# Patient Record
Sex: FEMALE | Race: WHITE
[De-identification: names, ages, dates, MRNs, and addresses within clinical notes are randomized per-mention and may not be internally consistent; named-entity substitution may affect disease eponyms.]

---

## 2018-04-03 ENCOUNTER — HOSPITAL ENCOUNTER (OUTPATIENT)
Dept: HOSPITAL 17 - CPRE | Age: 78
End: 2018-04-03
Attending: OBSTETRICS & GYNECOLOGY
Payer: MEDICARE

## 2018-04-03 DIAGNOSIS — Z01.810: Primary | ICD-10-CM

## 2018-04-03 DIAGNOSIS — Z01.812: ICD-10-CM

## 2018-04-03 DIAGNOSIS — R94.31: ICD-10-CM

## 2018-04-03 DIAGNOSIS — N81.11: ICD-10-CM

## 2018-04-03 DIAGNOSIS — R82.90: ICD-10-CM

## 2018-04-03 LAB
ALBUMIN SERPL-MCNC: 3.9 GM/DL (ref 3.4–5)
ALP SERPL-CCNC: 59 U/L (ref 45–117)
ALT SERPL-CCNC: 17 U/L (ref 10–53)
AST SERPL-CCNC: 9 U/L (ref 15–37)
BASOPHILS # BLD AUTO: 0.1 TH/MM3 (ref 0–0.2)
BASOPHILS NFR BLD: 0.9 % (ref 0–2)
BILIRUB SERPL-MCNC: 0.4 MG/DL (ref 0.2–1)
BUN SERPL-MCNC: 21 MG/DL (ref 7–18)
CALCIUM SERPL-MCNC: 9.5 MG/DL (ref 8.5–10.1)
CHLORIDE SERPL-SCNC: 103 MEQ/L (ref 98–107)
COLOR UR: (no result)
CREAT SERPL-MCNC: 1.09 MG/DL (ref 0.5–1)
EOSINOPHIL # BLD: 0.1 TH/MM3 (ref 0–0.4)
EOSINOPHIL NFR BLD: 1.5 % (ref 0–4)
ERYTHROCYTE [DISTWIDTH] IN BLOOD BY AUTOMATED COUNT: 14.7 % (ref 11.6–17.2)
GFR SERPLBLD BASED ON 1.73 SQ M-ARVRAT: 49 ML/MIN (ref 89–?)
GLUCOSE UR STRIP-MCNC: (no result) MG/DL
HCO3 BLD-SCNC: 27 MEQ/L (ref 21–32)
HCT VFR BLD CALC: 37.9 % (ref 35–46)
HGB BLD-MCNC: 13.4 GM/DL (ref 11.6–15.3)
HGB UR QL STRIP: (no result)
HYALINE CASTS #/AREA URNS LPF: 2 /LPF
KETONES UR STRIP-MCNC: (no result) MG/DL
LYMPHOCYTES # BLD AUTO: 1.1 TH/MM3 (ref 1–4.8)
LYMPHOCYTES NFR BLD AUTO: 13.5 % (ref 9–44)
MCH RBC QN AUTO: 30.6 PG (ref 27–34)
MCHC RBC AUTO-ENTMCNC: 35.3 % (ref 32–36)
MCV RBC AUTO: 86.5 FL (ref 80–100)
MONOCYTE #: 0.8 TH/MM3 (ref 0–0.9)
MONOCYTES NFR BLD: 9.3 % (ref 0–8)
NEUTROPHILS # BLD AUTO: 6.2 TH/MM3 (ref 1.8–7.7)
NEUTROPHILS NFR BLD AUTO: 74.8 % (ref 16–70)
NITRITE UR QL STRIP: (no result)
PLATELET # BLD: 264 TH/MM3 (ref 150–450)
PMV BLD AUTO: 6.6 FL (ref 7–11)
PROT SERPL-MCNC: 7.7 GM/DL (ref 6.4–8.2)
RBC # BLD AUTO: 4.39 MIL/MM3 (ref 4–5.3)
SODIUM SERPL-SCNC: 137 MEQ/L (ref 136–145)
SP GR UR STRIP: 1.01 (ref 1–1.03)
SQUAMOUS #/AREA URNS HPF: 4 /HPF (ref 0–5)
TRANS CELLS #/AREA URNS HPF: 1 /HPF
URINE LEUKOCYTE ESTERASE: (no result)
WBC # BLD AUTO: 8.2 TH/MM3 (ref 4–11)

## 2018-04-03 PROCEDURE — 36415 COLL VENOUS BLD VENIPUNCTURE: CPT

## 2018-04-03 PROCEDURE — 85025 COMPLETE CBC W/AUTO DIFF WBC: CPT

## 2018-04-03 PROCEDURE — 93005 ELECTROCARDIOGRAM TRACING: CPT

## 2018-04-03 PROCEDURE — 81001 URINALYSIS AUTO W/SCOPE: CPT

## 2018-04-03 PROCEDURE — 87086 URINE CULTURE/COLONY COUNT: CPT

## 2018-04-03 PROCEDURE — 80053 COMPREHEN METABOLIC PANEL: CPT

## 2018-04-03 NOTE — MH
cc:

Watson Barksdale MD

****

 

 

DATE OF ADMISSION:

2018

 

REASON FOR ADMISSION:

Scheduled for admission 2018 for anterior and posterior repair.

 

HISTORY OF PRESENT ILLNESS:

The patient is a 78-year-old white female,  3, para 3, who has 

issues with stage III pelvic organ prolapse, anterior compartment 

predominant.  She has documented urinary retention of over 900 mL.  

She, at this point, has opted for surgical correction.

 

PAST MEDICAL HISTORY:

Notable for hypertension, renal cyst on the right kidney, stable 6 cm.

 Also note, a distant history of hepatitis C, apparently has negative 

titers at this point.

 

MEDICATIONS:

1.  Xanax 0.25 mg every day p.r.n.

2.  Losartan 100 mg daily.

 

SOCIAL HISTORY:

, has good social support.  No alcohol, tobacco, or caffeine.

 

OB HISTORY:

Two vaginal deliveries.

 

GYN HISTORY:

No STDs or abnormal Pap smears.

 

ALLERGIES:

NONE.

 

FAMILY HISTORY:

Noncontributory.

 

REVIEW OF SYSTEMS:

As above.  No chest pain, orthopnea, PND.  No nausea, vomiting, fevers

or chills.  No vaginal bleeding or discharge.

 

PHYSICAL EXAM:

VITAL SIGNS:  She is afebrile, vital signs are stable.  Blood pressure

is 120/70.  Her height is 5 feet 2 inches, her weight 128.  Her BMI is

23.

GENERAL:  The patient is alert and oriented in no acute distress.  No 

sign of cognitive dysfunction or depression.

HEENT:  Within normal limits.

NECK:  Supple.  No JVD.

CHEST:  Clear.

HEART:  Regular rate and rhythm.

ABDOMEN:  Soft, nontender.  No hepatosplenomegaly, mass or tenderness.

PELVIC:  Exam in the office shows POP-Q score a Aa is +2.  Ap is 0.  

Urinary retention with postvoid residual of 900 mL. Further exam under

anesthesia.

EXTREMITIES:  Normal.

SKIN:  Without rashes.

NEUROLOGIC:  Nonfocal.  No DVT signs.

 

ASSESSMENT AND PLAN:

A patient with stage III pelvic organ prolapse, anterior compartment 

predominant with urinary retention.

 

The patient and I discussed options for management and treatment.  She

is aware of the risks, benefits and alternatives of the planned 

procedure including damage to surrounding organs, bleeding, infection,

de amina incontinence, need for catheterization, dyspareunia.

 

The patient understands the plan.  She is somewhat apprehensive about 

going home with a catheter, but she is more likely to have de amina 

incontinence and retention, but the need for catheterization should 

not require inpatient hospitalization.  We will use deep venous 

thrombosis prophylaxis with sequential compression device and 

antibiotic prophylaxis with Ancef 2 grams.

 

Anticipate outpatient procedures.

 

 

 

 

 

__________________________________

MD NICHOLAS Talavera/PRISCILA

D: 2018, 12:04 PM

T: 2018, 12:21 PM

Visit #: [id number]

Job #: [id number]

## 2018-04-04 NOTE — EKG
Date Performed: 04/03/2018       Time Performed: 12:31:09

 

PTAGE:      78 years

 

EKG:      Sinus rhythm 

 

 POSSIBLE LEFT ATRIAL ENLARGEMENT POSSIBLE RIGHT VENTRICULAR CONDUCTION DELAY POSSIBLE LEFT VENTRICUL
AR HYPERTROPHY ABNORMAL ECG 

 

NO PREVIOUS TRACING            

 

DOCTOR:   Eliazar Connolly  Interpretating Date/Time  04/04/2018 13:21:06

## 2018-04-11 ENCOUNTER — HOSPITAL ENCOUNTER (OUTPATIENT)
Dept: HOSPITAL 17 - HSDC | Age: 78
Discharge: HOME | End: 2018-04-11
Attending: OBSTETRICS & GYNECOLOGY
Payer: MEDICARE

## 2018-04-11 VITALS
OXYGEN SATURATION: 100 % | TEMPERATURE: 98 F | DIASTOLIC BLOOD PRESSURE: 53 MMHG | SYSTOLIC BLOOD PRESSURE: 144 MMHG | HEART RATE: 78 BPM | RESPIRATION RATE: 20 BRPM

## 2018-04-11 VITALS — WEIGHT: 127.43 LBS | HEIGHT: 67 IN | BODY MASS INDEX: 20 KG/M2

## 2018-04-11 DIAGNOSIS — R33.9: ICD-10-CM

## 2018-04-11 DIAGNOSIS — N28.1: ICD-10-CM

## 2018-04-11 DIAGNOSIS — N81.6: ICD-10-CM

## 2018-04-11 DIAGNOSIS — I10: ICD-10-CM

## 2018-04-11 DIAGNOSIS — N81.2: Primary | ICD-10-CM

## 2018-04-11 DIAGNOSIS — Z86.19: ICD-10-CM

## 2018-04-11 PROCEDURE — 57265 CMBN AP COLPRHY W/NTRCL RPR: CPT

## 2018-04-11 PROCEDURE — 57282 COLPOPEXY EXTRAPERITONEAL: CPT

## 2018-04-11 PROCEDURE — 00840 ANES IPER PX LOWER ABD NOS: CPT

## 2018-04-11 NOTE — MP
cc:

Watson Barksdale MD

****

 

 

DATE OF OPERATION:

04/11/2018

 

PREOPERATIVE DIAGNOSES

1.  Midline cystocele, code N81.11.

2.  Urinary retention, code R33.8.

3.  Rectocele, code N81.6.

4.  Uterine prolapse, code N81.2.

 

PROCEDURE PERFORMED:

1.  Anterior/posterior repair with enterocele, code 78690.

2.  Hysteropexy, code 83810.

3.  Diagnostic cystoscopy, code 43181.

 

SURGEON:

Watson Barksdale MD

 

ANESTHESIA:

General endotracheal.

 

ESTIMATED BLOOD LOSS:

20 mL.

 

URINE OUTPUT:

500 mL.

 

ASSISTANT:

[place] staff x 1.

 

FINDINGS:

External genitalia: poorly estrogenized.  POP-Q score Aa is +2,  Ap is

0, point C is -2, total vaginal length is 10, genital hiatus is 8, 

perineal body is 2.

 

Following repair:   Aa is -3, Ap is -3, point C is -8, total vaginal 

length is 8, genital hiatus is 4, perineal body is 5.

 

Rectal exam normal following repair.

 

Cystoscopy shows normal trigone, some sediment in the bladder, but no 

other significant findings.  Ureteral orifices patent x 2.  Dome and 

base of bladder, normal.  Urethra, normal

 

SPECIMENS:

None.

 

COMPLICATIONS:

None.

 

DISPOSITION:

To recovery room stable.

 

COUNTS:

Needle and sponge counts correct.

 

DRAINS:

Danw catheter.

 

ANTIBIOTIC PROPHYLAXIS:

Ancef 1 gram.

 

DVT PROPHYLAXIS:

Sequential compression devices.

 

TIME-OUT PROCEDURE AND IDENTIFICATION:

Per protocol.

 

SUMMARY OF INDICATIONS FOR THE PROCEDURE:

The patient with symptomatic pelvic organ prolapse and anterior 

compartment predominant with documented urinary retention.

 

PROCEDURE NOTE:

The patient was taken to the operating room theater, identified, 

prepped and draped in a fashion appropriate for the planned procedure.

 She was placed in the dorsal lithotomy position with careful 

attention paid to placement of legs in the stirrups to avoid undue 

stress test to sensitive neurovascular structures.  Neurovascular 

integrity documented.  A Dawn catheter was placed.  Methylene blue 

was instilled into the bladder.  The anterior mucosa was infiltrated 

with epinephrine/lidocaine solution.  A midline incision was made with

sharp scissors.  We dissected the perivesical tissues from the mucosa.

 There was no spill of methylene blue.  The colporrhaphy was performed

with delayed absorbable suture.  The mucosa was closed with a running 

Vicryl suture.

 

The posterior compartment still had significant defect.  We 

infiltrated here with epinephrine/lidocaine solution and made a 

midline incision to the apex, performed standard posterior repair and 

extraperitoneal hysteropexy without complication with one finger in 

the rectum to give us traction and countertraction.  There was no 

damage to the rectum.

 

The mucosa was trimmed.  The mucosa was closed.  Hemostatic matrix was

used for hemostasis to obviate the need for packing.

 

Perineorrhaphy was performed to decrease the genital hiatus from 8 to 

4 and this also helped to substantially increase the perineal body.

 

Procedure was concluded.  The patient reversed from anesthesia and 

taken to the recovery room in stable condition.  Also note, the 

cystoscopy was performed with the above findings.

 

 

 

 

__________________________________

Watson Barksdale MD

 

 

CJS/PRISCILA

D: 04/11/2018, 10:04 AM

T: 04/11/2018, 10:28 AM

Visit #: [id number]

Job #: [id number]